# Patient Record
Sex: MALE | NOT HISPANIC OR LATINO | URBAN - METROPOLITAN AREA
[De-identification: names, ages, dates, MRNs, and addresses within clinical notes are randomized per-mention and may not be internally consistent; named-entity substitution may affect disease eponyms.]

---

## 2017-04-19 ENCOUNTER — EMERGENCY (EMERGENCY)
Facility: HOSPITAL | Age: 22
LOS: 0 days | Discharge: HOME | End: 2017-04-19
Attending: EMERGENCY MEDICINE | Admitting: EMERGENCY MEDICINE
Payer: MEDICAID

## 2017-04-19 VITALS
SYSTOLIC BLOOD PRESSURE: 131 MMHG | OXYGEN SATURATION: 100 % | DIASTOLIC BLOOD PRESSURE: 78 MMHG | RESPIRATION RATE: 18 BRPM | HEART RATE: 81 BPM | TEMPERATURE: 98 F

## 2017-04-19 VITALS — WEIGHT: 165.35 LBS | HEIGHT: 68.9 IN

## 2017-04-19 DIAGNOSIS — M54.5 LOW BACK PAIN: ICD-10-CM

## 2017-04-19 PROCEDURE — 99281 EMR DPT VST MAYX REQ PHY/QHP: CPT

## 2017-04-19 RX ORDER — CYCLOBENZAPRINE HYDROCHLORIDE 10 MG/1
1 TABLET, FILM COATED ORAL
Qty: 21 | Refills: 0 | OUTPATIENT
Start: 2017-04-19 | End: 2017-04-26

## 2017-04-19 RX ORDER — OXYCODONE HYDROCHLORIDE 5 MG/1
1 TABLET ORAL
Qty: 20 | Refills: 0 | OUTPATIENT
Start: 2017-04-19 | End: 2017-04-24

## 2017-04-19 RX ADMIN — Medication 50 MILLIGRAM(S): at 17:02

## 2017-04-19 NOTE — ED STATDOCS - NS ED MD SCRIBE ATTENDING SCRIBE SECTIONS
PAST MEDICAL/SURGICAL/SOCIAL HISTORY/HISTORY OF PRESENT ILLNESS/PHYSICAL EXAM/RESULTS/PROGRESS NOTE/REVIEW OF SYSTEMS/DISPOSITION

## 2017-04-19 NOTE — ED STATDOCS - OBJECTIVE STATEMENT
20 y/o male with PMHx of DDD presents to the ED c/o lower back pain that started this afternoon at work. Pt states that he picked up something at work when the back pain started. He reports the pain radiating to his bilateral LE. Pt took 400mg of advil PTA and states that the pain is worse with walking. Currently pt has no other complaints and denies chest pain, abd pain, n/v/d, urinary or bowel incontinence and fever.

## 2019-06-23 ENCOUNTER — EMERGENCY (EMERGENCY)
Facility: HOSPITAL | Age: 24
LOS: 0 days | Discharge: HOME | End: 2019-06-23
Attending: EMERGENCY MEDICINE | Admitting: EMERGENCY MEDICINE
Payer: MEDICAID

## 2019-06-23 VITALS — HEIGHT: 68 IN | WEIGHT: 160.06 LBS

## 2019-06-23 VITALS
TEMPERATURE: 98 F | HEART RATE: 73 BPM | OXYGEN SATURATION: 100 % | SYSTOLIC BLOOD PRESSURE: 123 MMHG | RESPIRATION RATE: 18 BRPM | DIASTOLIC BLOOD PRESSURE: 71 MMHG

## 2019-06-23 DIAGNOSIS — M79.661 PAIN IN RIGHT LOWER LEG: ICD-10-CM

## 2019-06-23 DIAGNOSIS — M51.36 OTHER INTERVERTEBRAL DISC DEGENERATION, LUMBAR REGION: ICD-10-CM

## 2019-06-23 DIAGNOSIS — Z79.899 OTHER LONG TERM (CURRENT) DRUG THERAPY: ICD-10-CM

## 2019-06-23 DIAGNOSIS — M54.9 DORSALGIA, UNSPECIFIED: ICD-10-CM

## 2019-06-23 DIAGNOSIS — M79.662 PAIN IN LEFT LOWER LEG: ICD-10-CM

## 2019-06-23 PROCEDURE — 99283 EMERGENCY DEPT VISIT LOW MDM: CPT

## 2019-06-23 RX ORDER — KETOROLAC TROMETHAMINE 30 MG/ML
60 SYRINGE (ML) INJECTION ONCE
Refills: 0 | Status: DISCONTINUED | OUTPATIENT
Start: 2019-06-23 | End: 2019-06-23

## 2019-06-23 RX ORDER — LIDOCAINE 4 G/100G
1 CREAM TOPICAL ONCE
Refills: 0 | Status: COMPLETED | OUTPATIENT
Start: 2019-06-23 | End: 2019-06-23

## 2019-06-23 RX ORDER — CYCLOBENZAPRINE HYDROCHLORIDE 10 MG/1
1 TABLET, FILM COATED ORAL
Qty: 21 | Refills: 0
Start: 2019-06-23 | End: 2019-06-29

## 2019-06-23 RX ADMIN — Medication 60 MILLIGRAM(S): at 15:34

## 2019-06-23 RX ADMIN — LIDOCAINE 1 PATCH: 4 CREAM TOPICAL at 15:34

## 2019-06-23 NOTE — ED STATDOCS - CLINICAL SUMMARY MEDICAL DECISION MAKING FREE TEXT BOX
No red flags for cord compression, epidural abscess or hematoma. Will treat with Flexeril and Toradol shot.

## 2019-06-23 NOTE — ED STATDOCS - ATTENDING CONTRIBUTION TO CARE
I, Dale Wiggins, performed the initial face to face bedside interview with this patient regarding history of present illness, review of symptoms and relevant past medical, social and family history.  I completed an independent physical examination.  I was the initial provider who evaluated this patient. I have signed out the follow up of any pending tests (i.e. labs, radiological studies) to the ACP.  I have communicated the patient’s plan of care and disposition with the ACP.  The history, relevant review of systems, past medical and surgical history, medical decision making, and physical examination was documented by the scribe in my presence and I attest to the accuracy of the documentation.

## 2019-06-23 NOTE — ED ADULT TRIAGE NOTE - CHIEF COMPLAINT QUOTE
pt c/o lower back pain radiating down b/l LE x 3 days , denies any new injury , pt has hx disc herniation 2011

## 2019-06-23 NOTE — ED STATDOCS - PROGRESS NOTE DETAILS
23 yr. old male PMH: 23 yr. old male PMH: DDD presents to ED with back pain radiating to lower extremities. Taking advil with little relief. No incontinence of urine or stool. No caudal anesthesia. Seen and examined by attending in intake. Plan: Toradol and Lido patch. Will F/U with results and re evaluate. Zenaida NP Feeling better after Toradol. Will have patient F/U with spine specialist. MTangredi NP

## 2019-06-23 NOTE — ED ADULT NURSE NOTE - OBJECTIVE STATEMENT
Pt presents to ED for lower back pain radiating down b/l LE x 3 days. Pt states he has been taking advil for 3 days but no relief. Denies any new injury , pt has hx disc herniation 2011

## 2019-06-23 NOTE — ED STATDOCS - OBJECTIVE STATEMENT
22 y/o male with a PMHx of DDD presents to the ED c/o back pain x3 days. States he has chronic back pain from herniated lumbar disks. Reports pain has become worse and radiates down bilateral LE into his feet. States he has been taking Advil and applies a pain patch without relief. Last taken yesterday. Denies urine incontinence, urine incontinence, bowel incontinence, fever, fall or new accident/injury.

## 2023-11-09 ENCOUNTER — NON-APPOINTMENT (OUTPATIENT)
Age: 28
End: 2023-11-09

## 2025-03-12 NOTE — ED STATDOCS - GASTROINTESTINAL, MLM
Quality 130: Documentation Of Current Medications In The Medical Record: Current Medications Documented Quality 431: Preventive Care And Screening: Unhealthy Alcohol Use - Screening: Patient screened for unhealthy alcohol use using a single question and scores less than 2 times per year Quality 402: Tobacco Use And Help With Quitting Among Adolescents: Patient screened for tobacco and never smoked Detail Level: Detailed abdomen soft, non-tender, and non-distended. Bowel sounds present. None